# Patient Record
Sex: FEMALE | Race: WHITE | Employment: FULL TIME | URBAN - METROPOLITAN AREA
[De-identification: names, ages, dates, MRNs, and addresses within clinical notes are randomized per-mention and may not be internally consistent; named-entity substitution may affect disease eponyms.]

---

## 2019-02-15 ENCOUNTER — HOSPITAL ENCOUNTER (OUTPATIENT)
Dept: SLEEP MEDICINE | Age: 49
Discharge: HOME | End: 2019-02-15
Payer: COMMERCIAL

## 2019-02-15 PROCEDURE — 95810 POLYSOM 6/> YRS 4/> PARAM: CPT

## 2022-01-04 LAB — PAP SMEAR, EXTERNAL: NORMAL

## 2022-06-22 RX ORDER — SERTRALINE HYDROCHLORIDE 100 MG/1
TABLET, FILM COATED ORAL
Qty: 180 TABLET | Refills: 3 | OUTPATIENT
Start: 2022-06-22

## 2022-09-09 DIAGNOSIS — E89.0 POST-SURGICAL HYPOTHYROIDISM: ICD-10-CM

## 2022-09-09 DIAGNOSIS — E78.2 MIXED HYPERLIPIDEMIA: Primary | ICD-10-CM

## 2022-09-13 ENCOUNTER — NURSE ONLY (OUTPATIENT)
Dept: INTERNAL MEDICINE CLINIC | Facility: CLINIC | Age: 52
End: 2022-09-13

## 2022-09-13 DIAGNOSIS — E89.0 POST-SURGICAL HYPOTHYROIDISM: ICD-10-CM

## 2022-09-13 DIAGNOSIS — E78.2 MIXED HYPERLIPIDEMIA: ICD-10-CM

## 2022-09-14 LAB
ALT SERPL-CCNC: 29 U/L (ref 12–65)
AST SERPL-CCNC: 19 U/L (ref 15–37)
CHOLEST SERPL-MCNC: 282 MG/DL
HDLC SERPL-MCNC: 46 MG/DL (ref 40–60)
HDLC SERPL: 6.1 {RATIO}
LDLC SERPL CALC-MCNC: 187.4 MG/DL
TRIGL SERPL-MCNC: 243 MG/DL (ref 35–150)
TSH, 3RD GENERATION: 7.6 UIU/ML (ref 0.36–3.74)
VLDLC SERPL CALC-MCNC: 48.6 MG/DL (ref 6–23)

## 2022-09-20 ENCOUNTER — OFFICE VISIT (OUTPATIENT)
Dept: INTERNAL MEDICINE CLINIC | Facility: CLINIC | Age: 52
End: 2022-09-20
Payer: COMMERCIAL

## 2022-09-20 VITALS
OXYGEN SATURATION: 100 % | SYSTOLIC BLOOD PRESSURE: 122 MMHG | HEART RATE: 82 BPM | TEMPERATURE: 97.1 F | WEIGHT: 199 LBS | HEIGHT: 68 IN | BODY MASS INDEX: 30.16 KG/M2 | DIASTOLIC BLOOD PRESSURE: 74 MMHG

## 2022-09-20 DIAGNOSIS — F42.9 OBSESSIVE-COMPULSIVE DISORDER, UNSPECIFIED TYPE: ICD-10-CM

## 2022-09-20 DIAGNOSIS — Z79.899 ENCOUNTER FOR LONG-TERM (CURRENT) USE OF MEDICATIONS: ICD-10-CM

## 2022-09-20 DIAGNOSIS — G25.81 RESTLESS LEGS SYNDROME: ICD-10-CM

## 2022-09-20 DIAGNOSIS — Z00.00 LABORATORY EXAM ORDERED AS PART OF ROUTINE GENERAL MEDICAL EXAMINATION: ICD-10-CM

## 2022-09-20 DIAGNOSIS — E89.0 POST-SURGICAL HYPOTHYROIDISM: Primary | ICD-10-CM

## 2022-09-20 DIAGNOSIS — G35 MULTIPLE SCLEROSIS (HCC): ICD-10-CM

## 2022-09-20 DIAGNOSIS — E78.2 MIXED HYPERLIPIDEMIA: ICD-10-CM

## 2022-09-20 DIAGNOSIS — Z80.0 FAMILY HISTORY OF COLON CANCER: ICD-10-CM

## 2022-09-20 PROCEDURE — 99215 OFFICE O/P EST HI 40 MIN: CPT | Performed by: INTERNAL MEDICINE

## 2022-09-20 RX ORDER — LEVOTHYROXINE SODIUM 0.12 MG/1
125 TABLET ORAL DAILY
Qty: 90 TABLET | Refills: 3 | Status: SHIPPED | OUTPATIENT
Start: 2022-09-20

## 2022-09-20 RX ORDER — POTASSIUM CHLORIDE 20 MEQ/1
20 TABLET, EXTENDED RELEASE ORAL 2 TIMES DAILY
Qty: 180 TABLET | Refills: 3 | Status: SHIPPED | OUTPATIENT
Start: 2022-09-20

## 2022-09-20 RX ORDER — PRAMIPEXOLE DIHYDROCHLORIDE 0.25 MG/1
0.25 TABLET ORAL NIGHTLY
Qty: 90 TABLET | Refills: 3 | Status: SHIPPED | OUTPATIENT
Start: 2022-09-20

## 2022-09-20 RX ORDER — ACETAMINOPHEN AND CODEINE PHOSPHATE 120; 12 MG/5ML; MG/5ML
0.35 SOLUTION ORAL DAILY
COMMUNITY
Start: 2022-03-25 | End: 2023-03-25

## 2022-09-20 RX ORDER — SERTRALINE HYDROCHLORIDE 100 MG/1
200 TABLET, FILM COATED ORAL DAILY
Qty: 180 TABLET | Refills: 3 | Status: SHIPPED | OUTPATIENT
Start: 2022-09-20

## 2022-09-20 NOTE — PROGRESS NOTES
ASSESSMENT/PLAN:        1. Post-surgical hypothyroidism  Increase Levothyroxine slightly to 125 mcg daily.    - Lipid Panel; Future  - TSH with Reflex; Future  - Hemoglobin A1C; Future  - Comprehensive Metabolic Panel; Future  - CBC; Future  - levothyroxine (SYNTHROID) 125 MCG tablet; Take 1 tablet by mouth Daily TAKE 1 TABLET BY MOUTH DAILY BEFORE BREAKFAST  Dispense: 90 tablet; Refill: 3    2. Multiple sclerosis [facility])  Per neurology. MRI stable. - Comprehensive Metabolic Panel; Future  - CBC; Future    3. Obsessive-compulsive disorder, unspecified type  Continue Zoloft 200 mg daily. She can take in divided dosing OR at the same time if she prefers. - sertraline (ZOLOFT) 100 MG tablet; Take 2 tablets by mouth daily  Dispense: 180 tablet; Refill: 3  - TSH with Reflex; Future  - Comprehensive Metabolic Panel; Future  - CBC; Future    4. Restless legs syndrome  Treatment regimen is effective. - pramipexole (MIRAPEX) 0.25 MG tablet; Take 1 tablet by mouth at bedtime  Dispense: 90 tablet; Refill: 3    5. Mixed hyperlipidemia  CT Calcium score. Orders for [facility]. Discussed risk stratification and if severely elevated or if there were additional incidental findings, potentially there could be additional testing or evaluation based on findings.    - CT CARDIAC CALCIUM SCORING; Future  - Lipid Panel; Future    6. Family history of colon cancer  Colonoscopy every 5 years. Health maintenance updated to reflect this. 7. Laboratory exam ordered as part of routine general medical examination     - Lipid Panel; Future  - TSH with Reflex; Future  - Hemoglobin A1C; Future  - Comprehensive Metabolic Panel; Future  - CBC; Future    8. Encounter for long-term (current) use of medications     - Lipid Panel; Future  - TSH with Reflex; Future  - Hemoglobin A1C; Future  - Comprehensive Metabolic Panel; Future  - CBC;  Future        Evaluation and management of the chronic condition(s) delineated. No negative side effects reported. I have reviewed all the lab results. There are some abnormalities that are either expected or not critical to the patient's health, and are discussed in the office today and are addressed. Please refer to the above assessement and plan narrative and orders and follow up plan. Medication discussed and refilled as needed. Physical exam findings are stable unless otherwise indicated and this is addressed. The most recent lab work and imaging and consultant/urgent care visits and imaging are reviewed and discussed and considered during this visit encounter. 1. Post-surgical hypothyroidism  -     Lipid Panel; Future  -     TSH with Reflex; Future  -     Hemoglobin A1C; Future  -     Comprehensive Metabolic Panel; Future  -     CBC; Future  -     levothyroxine (SYNTHROID) 125 MCG tablet; Take 1 tablet by mouth Daily TAKE 1 TABLET BY MOUTH DAILY BEFORE BREAKFAST, Disp-90 tablet, R-3Normal  2. Multiple sclerosis ([facility] [address]  -     Comprehensive Metabolic Panel; Future  -     CBC; Future  3. Obsessive-compulsive disorder, unspecified type  -     sertraline (ZOLOFT) 100 MG tablet; Take 2 tablets by mouth daily, Disp-180 tablet, R-3Normal  -     TSH with Reflex; Future  -     Comprehensive Metabolic Panel; Future  -     CBC; Future  4. Restless legs syndrome  -     pramipexole (MIRAPEX) 0.25 MG tablet; Take 1 tablet by mouth at bedtime, Disp-90 tablet, R-3Normal  5. Mixed hyperlipidemia  -     CT CARDIAC CALCIUM SCORING; Future  -     Lipid Panel; Future  6. Family history of colon cancer  7. Laboratory exam ordered as part of routine general medical examination  -     Lipid Panel; Future  -     TSH with Reflex; Future  -     Hemoglobin A1C; Future  -     Comprehensive Metabolic Panel; Future  -     CBC; Future  8. 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 Discussed Shingrix in several months. Mother passed away recently. Family stress with settling estate and some complex family issues with her sister. Her Zoloft has helped her manage and deal with this and she is tolerating 200 mg daily without neg SE. She is grieving normally. She has some difficulty with sleep. RLS has been managed pretty well with low dose Mirapex prn. 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 Sister recently had surgery for Colon Ca. Stage 1. No chemo or adjuvant therapy. Colonoscopy every 5 yrs recommended. Chronic MS and Migraine Headache syndrome managed by neurology. MRI 4/18/2022 and Neurology notes reviewed. Briefly tried methylphenidate 10 mg for fatigue and MS, but has not continued. Pap smear with GYN 1/4/22        Labs reviewed and discussed and medication refilled as needed for chronic medications during ov or adjusted based on lab results and/or our discussion as appropriate. See discussion. The patient's available records and electronic chart records are reviewed. The PMH, PSH, medications, allergies, medications, FH, health maintenance and vaccination status are all reviewed and updated as appropriate. Records from outside providers have been reviewed, summarized, and considered as noted in the history of present illness, past medical history, and objective data of this note and encounter.           Health Maintenance   Topic Date Due    Depression Screen  Never done    Shingles vaccine (1 of 2) Never done    DTaP/Tdap/Td vaccine (3 - Td or Tdap) 08/12/2021    COVID-19 Vaccine (2 - Booster for [place] series) 01/11/2022    Breast cancer screen  09/01/2022    Lipids  09/13/2023    Colorectal Cancer Screen  12/31/2024    Cervical cancer screen  01/04/2025    Flu vaccine  Completed    Hepatitis C screen Completed    HIV screen  Completed    Hepatitis A vaccine  Aged Out    Hepatitis B vaccine  Aged Out    Hib vaccine  Aged Out    Meningococcal (ACWY) vaccine  Aged Out    Pneumococcal 0-64 years Vaccine  Aged Out     Patient Active Problem List   Diagnosis    Mixed hyperlipidemia    Other migraine without status migrainosus, not intractable    Multiple sclerosis ([facility] [address]    Mixed obsessional thoughts and acts    OCD (obsessive compulsive disorder)    Esophageal reflux    H/O seasonal allergies    Post-surgical hypothyroidism    Family history of colon cancer       Review of Systems   All other systems reviewed and are negative.     Lab Results   Component Value Date/Time    WBC 4.4 03/01/2022 08:36 AM    HGB 12.9 03/01/2022 08:36 AM    HCT 40.1 03/01/2022 08:36 AM    MCV 91 03/01/2022 08:36 AM    RDW 12.8 03/01/2022 08:36 AM     03/01/2022 08:36 AM    NEUTOPHILPCT 51 03/01/2022 08:36 AM    LYMPHOPCT 36 03/01/2022 08:36 AM    MONOPCT 9 03/01/2022 08:36 AM    EOSRELPCT 3 03/01/2022 08:36 AM    BASOPCT 1 03/01/2022 08:36 AM    LYMPHSABS 1.6 03/01/2022 08:36 AM    MONOSABS 0.4 03/01/2022 08:36 AM    EOSABS 0.1 03/01/2022 08:36 AM    BASOSABS 0.1 03/01/2022 08:36 AM    IMMGRAN 0 03/01/2022 08:36 AM    GRANULOCYTEABSOLUTECOUNT 0.0 03/01/2022 08:36 AM       Lab Results   Component Value Date/Time     03/01/2022 08:36 AM    K 4.3 03/01/2022 08:36 AM     03/01/2022 08:36 AM    CO2 19 03/01/2022 08:36 AM    GLUCOSE 92 03/01/2022 08:36 AM    BUN 19 03/01/2022 08:36 AM    CREATININE 0.95 03/01/2022 08:36 AM    GFRAA 79 03/26/2021 08:01 AM    CALCIUM 9.1 03/01/2022 08:36 AM    BILITOT 0.2 03/01/2022 08:36 AM    ALT 29 09/13/2022 08:19 AM    AST 19 09/13/2022 08:19 AM    ALKPHOS 56 03/01/2022 08:36 AM    PROT 6.7 03/01/2022 08:36 AM    LABALBU 4.2 03/01/2022 08:36 AM       Lab Results   Component Value Date/Time    CHOL 282 09/13/2022 08:19 AM    HDL 46 09/13/2022 08:19 AM    TRIG 243 09/13/2022 08:19 AM    LDLCALC 187.4 09/13/2022 08:19 AM    VLDL 28 03/01/2022 08:36 AM       Lab Results   Component Value Date/Time    LABA1C 5.4 03/01/2022 08:36 AM    LABA1C 5.3 09/18/2020 08:27 AM       Lab Results   Component Value Date/Time    TSH 3.480 03/01/2022 08:36 AM    TSH 3.300 07/14/2021 03:35 PM    TSH 6.290 03/26/2021 08:01 AM           Lab Results   Component Value Date/Time    SPECGRAV 1.018 03/01/2022 08:36 AM    PHUR 7.0 03/01/2022 08:36 AM    COLORU Yellow 03/01/2022 08:36 AM    CLARITYU Cloudy 03/01/2022 08:36 AM    LEUKOCYTESUR Negative 03/01/2022 08:36 AM    PROTEINU Negative 03/01/2022 08:36 AM    GLUCOSEU Negative 03/01/2022 08:36 AM    KETUA Negative 03/01/2022 08:36 AM    BLOODU Negative 03/01/2022 08:36 AM    BILIRUBINUR Negative 03/01/2022 08:36 AM    UROBILINOGEN 0.2 03/01/2022 08:36 AM    NITRU Negative 03/01/2022 08:36 AM    LABMICR Comment 03/01/2022 08:36 AM           Vitals:    09/20/22 1530   BP: 122/74   Site: Left Upper Arm   Position: Sitting   Cuff Size: Small Adult   Pulse: 82   Temp: 97.1 °F (36.2 °C)   TempSrc: Skin   SpO2: 100%   Weight: 199 lb (90.3 kg)   Height: 5' 8\" (1.727 m)     Wt Readings from Last 3 Encounters:   09/20/22 199 lb (90.3 kg)   03/08/22 202 lb (91.6 kg)     BP Readings from Last 3 Encounters:   09/20/22 122/74   03/08/22 120/76     Physical Exam  Vitals and nursing note reviewed. Constitutional:       Appearance: Normal appearance. She is not ill-appearing. HENT:      Head: Normocephalic and atraumatic. Eyes:      Extraocular Movements: Extraocular movements intact. Conjunctiva/sclera: Conjunctivae normal.   Cardiovascular:      Rate and Rhythm: Normal rate and regular rhythm. Pulmonary:      Effort: Pulmonary effort is normal.      Breath sounds: Normal breath sounds. Neurological:      General: No focal deficit present. Mental Status: She is alert and oriented to person, place, and time. Mental status is at baseline.    Psychiatric:         Mood and Affect: Mood normal.         Behavior: Behavior normal.

## 2022-09-20 NOTE — Clinical Note
Please let her know I did adjust her Synthroid to 125 mcg. I think we got off topic a little and forgot to let her know that's what I decided. Thanks.

## 2022-09-21 ENCOUNTER — TELEPHONE (OUTPATIENT)
Dept: INTERNAL MEDICINE CLINIC | Facility: CLINIC | Age: 52
End: 2022-09-21

## 2022-09-21 NOTE — TELEPHONE ENCOUNTER
----- Message from Ivsi Villalobos MD sent at 9/20/2022  5:33 PM EDT -----  Please let her know I did adjust her Synthroid to 125 mcg. I think we got off topic a little and forgot to let her know that's what I decided. Thanks.

## 2022-10-07 ENCOUNTER — HOSPITAL ENCOUNTER (OUTPATIENT)
Dept: CT IMAGING | Age: 52
Discharge: HOME | End: 2022-10-10

## 2022-10-07 DIAGNOSIS — E78.2 MIXED HYPERLIPIDEMIA: ICD-10-CM

## 2022-10-07 PROCEDURE — 75571 CT HRT W/O DYE W/CA TEST: CPT

## 2022-10-09 NOTE — RESULT ENCOUNTER NOTE
CT calcium score is Zero. That would argue for a very low cardiovascular risk. I will not push you to take a statin medication at this time. I will review the images with you at  your next visit. Good news in my opinion. Stefanie Gupta MD

## 2023-03-27 LAB
AVERAGE GLUCOSE: NORMAL
HBA1C MFR BLD: 5.3 %

## 2023-03-30 ENCOUNTER — OFFICE VISIT (OUTPATIENT)
Dept: INTERNAL MEDICINE CLINIC | Facility: CLINIC | Age: 53
End: 2023-03-30
Payer: COMMERCIAL

## 2023-03-30 VITALS
WEIGHT: 201 LBS | SYSTOLIC BLOOD PRESSURE: 122 MMHG | BODY MASS INDEX: 30.46 KG/M2 | OXYGEN SATURATION: 98 % | DIASTOLIC BLOOD PRESSURE: 82 MMHG | TEMPERATURE: 97.2 F | HEIGHT: 68 IN | HEART RATE: 73 BPM

## 2023-03-30 DIAGNOSIS — Z12.31 ENCOUNTER FOR SCREENING MAMMOGRAM FOR MALIGNANT NEOPLASM OF BREAST: ICD-10-CM

## 2023-03-30 DIAGNOSIS — G35 MULTIPLE SCLEROSIS (HCC): ICD-10-CM

## 2023-03-30 DIAGNOSIS — E78.2 MIXED HYPERLIPIDEMIA: ICD-10-CM

## 2023-03-30 DIAGNOSIS — F42.9 OBSESSIVE-COMPULSIVE DISORDER, UNSPECIFIED TYPE: ICD-10-CM

## 2023-03-30 DIAGNOSIS — E89.0 POST-SURGICAL HYPOTHYROIDISM: Primary | ICD-10-CM

## 2023-03-30 DIAGNOSIS — Z80.0 FAMILY HISTORY OF COLON CANCER: ICD-10-CM

## 2023-03-30 DIAGNOSIS — G25.81 RESTLESS LEGS SYNDROME: ICD-10-CM

## 2023-03-30 PROCEDURE — 99214 OFFICE O/P EST MOD 30 MIN: CPT | Performed by: INTERNAL MEDICINE

## 2023-03-30 RX ORDER — PRAMIPEXOLE DIHYDROCHLORIDE 0.25 MG/1
0.25 TABLET ORAL NIGHTLY
Qty: 90 TABLET | Refills: 3 | Status: SHIPPED | OUTPATIENT
Start: 2023-03-30

## 2023-03-30 RX ORDER — CONJUGATED ESTROGENS 0.62 MG/G
CREAM VAGINAL
COMMUNITY
Start: 2023-02-06

## 2023-03-30 RX ORDER — SERTRALINE HYDROCHLORIDE 100 MG/1
200 TABLET, FILM COATED ORAL DAILY
Qty: 180 TABLET | Refills: 3 | Status: SHIPPED | OUTPATIENT
Start: 2023-03-30

## 2023-03-30 RX ORDER — POTASSIUM CHLORIDE 20 MEQ/1
20 TABLET, EXTENDED RELEASE ORAL 2 TIMES DAILY
Qty: 180 TABLET | Refills: 3 | Status: SHIPPED | OUTPATIENT
Start: 2023-03-30

## 2023-03-30 RX ORDER — INTERFERON BETA-1A 30MCG/.5ML
KIT INTRAMUSCULAR
COMMUNITY
Start: 2023-03-28

## 2023-03-30 RX ORDER — LEVOTHYROXINE SODIUM 0.12 MG/1
125 TABLET ORAL DAILY
Qty: 90 TABLET | Refills: 3 | Status: SHIPPED | OUTPATIENT
Start: 2023-03-30

## 2023-03-30 SDOH — ECONOMIC STABILITY: HOUSING INSECURITY
IN THE LAST 12 MONTHS, WAS THERE A TIME WHEN YOU DID NOT HAVE A STEADY PLACE TO SLEEP OR SLEPT IN A SHELTER (INCLUDING NOW)?: NO

## 2023-03-30 SDOH — ECONOMIC STABILITY: FOOD INSECURITY: WITHIN THE PAST 12 MONTHS, THE FOOD YOU BOUGHT JUST DIDN'T LAST AND YOU DIDN'T HAVE MONEY TO GET MORE.: NEVER TRUE

## 2023-03-30 SDOH — ECONOMIC STABILITY: FOOD INSECURITY: WITHIN THE PAST 12 MONTHS, YOU WORRIED THAT YOUR FOOD WOULD RUN OUT BEFORE YOU GOT MONEY TO BUY MORE.: NEVER TRUE

## 2023-03-30 SDOH — ECONOMIC STABILITY: INCOME INSECURITY: HOW HARD IS IT FOR YOU TO PAY FOR THE VERY BASICS LIKE FOOD, HOUSING, MEDICAL CARE, AND HEATING?: NOT HARD AT ALL

## 2023-03-30 ASSESSMENT — PATIENT HEALTH QUESTIONNAIRE - PHQ9
1. LITTLE INTEREST OR PLEASURE IN DOING THINGS: 0
SUM OF ALL RESPONSES TO PHQ QUESTIONS 1-9: 0
SUM OF ALL RESPONSES TO PHQ9 QUESTIONS 1 & 2: 0
SUM OF ALL RESPONSES TO PHQ QUESTIONS 1-9: 0
SUM OF ALL RESPONSES TO PHQ QUESTIONS 1-9: 0
2. FEELING DOWN, DEPRESSED OR HOPELESS: 0
SUM OF ALL RESPONSES TO PHQ QUESTIONS 1-9: 0

## 2023-03-30 NOTE — PROGRESS NOTES
Patient denies change in energy level, diarrhea, heat / cold intolerance, nervousness, palpitations, and weight changes. TSH has fluctuated some. Lab Results   Component Value Date    TSH 3.480 03/01/2022    RFN2NRT 7.600 (H) 09/13/2022     Shingles resolved. Consider Shingrix. Family stress with settling estate improved. She has some complex family issues with her sister. She has a sister that has colon cancer as well. Her Zoloft has helped her manage and deal with this and she is tolerating 200 mg daily without neg SE. She has a new dog. Very pleased. Having fun. Family trip planned to beach this summer on [place].    She has grieved the loss of her mother normally. She has some difficulty with sleep. RLS has been managed pretty well with low dose Mirapex prn. 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 Sister recently had surgery for Colon Ca. Stage 1. No chemo or adjuvant therapy. Colonoscopy every 5 yrs recommended. Pap smear with GYN 1/4/22      Multiple Sclerosis:  Neurology following- followed by Dr. Rogelio Morse in [place], [place]. Brain and Cervical mri's done (10/27/17) and reviewed from [facility]. Stable. No new lesions. Currently on Avonex. She took Avonex for some time and more recently was switched to Tecfidera. Vision returned to normal early on and Ms. Simmons says she has never had any other symptoms. She stoppedTecfidera because she could not tolerate the side effects of flushing and nausea. She could not tolerate Gilenya because of more frequent headaches. She is now back on Avonex. MRI 4/18/2022 and Neurology notes reviewed. Briefly tried methylphenidate 10 mg for fatigue and MS, but has not continued. Migraine Headaches:  Headaches are described as a unilateral, throbbing type pain, often accompanied by photophobia, phonophobia, and occ. Nausea.   She has had

## 2023-03-30 NOTE — Clinical Note
Mammogram results. Need report for HM from [facility]. Reportedly normal but no report. 9/7/22.   Stephen Nova MD

## 2023-03-31 ENCOUNTER — TELEPHONE (OUTPATIENT)
Dept: INTERNAL MEDICINE CLINIC | Facility: CLINIC | Age: 53
End: 2023-03-31

## 2023-03-31 NOTE — TELEPHONE ENCOUNTER
----- Message from Mendel Fuelling, MD sent at 3/30/2023  4:30 PM EDT -----  Mammogram results. Need report for HM from [facility]. Reportedly normal but no report. 9/7/22.   Kevin Barros MD

## 2023-04-21 ENCOUNTER — TELEPHONE (OUTPATIENT)
Dept: INTERNAL MEDICINE CLINIC | Facility: CLINIC | Age: 53
End: 2023-04-21

## 2023-04-29 PROBLEM — Z12.31 ENCOUNTER FOR SCREENING MAMMOGRAM FOR MALIGNANT NEOPLASM OF BREAST: Status: RESOLVED | Noted: 2023-03-30 | Resolved: 2023-04-29

## 2023-09-26 DIAGNOSIS — Z80.0 FAMILY HISTORY OF COLON CANCER: ICD-10-CM

## 2023-09-26 DIAGNOSIS — G25.81 RESTLESS LEGS SYNDROME: ICD-10-CM

## 2023-09-26 DIAGNOSIS — E89.0 POST-SURGICAL HYPOTHYROIDISM: ICD-10-CM

## 2023-09-26 DIAGNOSIS — E78.2 MIXED HYPERLIPIDEMIA: ICD-10-CM

## 2023-09-26 DIAGNOSIS — G35 MULTIPLE SCLEROSIS (HCC): ICD-10-CM

## 2023-09-26 DIAGNOSIS — F42.9 OBSESSIVE-COMPULSIVE DISORDER, UNSPECIFIED TYPE: ICD-10-CM

## 2023-09-30 ASSESSMENT — PATIENT HEALTH QUESTIONNAIRE - PHQ9
SUM OF ALL RESPONSES TO PHQ QUESTIONS 1-9: 2
SUM OF ALL RESPONSES TO PHQ QUESTIONS 1-9: 2
2. FEELING DOWN, DEPRESSED OR HOPELESS: SEVERAL DAYS
SUM OF ALL RESPONSES TO PHQ9 QUESTIONS 1 & 2: 2
1. LITTLE INTEREST OR PLEASURE IN DOING THINGS: SEVERAL DAYS
2. FEELING DOWN, DEPRESSED OR HOPELESS: 1
1. LITTLE INTEREST OR PLEASURE IN DOING THINGS: 1
SUM OF ALL RESPONSES TO PHQ QUESTIONS 1-9: 2
SUM OF ALL RESPONSES TO PHQ9 QUESTIONS 1 & 2: 2
SUM OF ALL RESPONSES TO PHQ QUESTIONS 1-9: 2

## 2023-10-03 ENCOUNTER — OFFICE VISIT (OUTPATIENT)
Dept: INTERNAL MEDICINE CLINIC | Facility: CLINIC | Age: 53
End: 2023-10-03
Payer: COMMERCIAL

## 2023-10-03 VITALS
SYSTOLIC BLOOD PRESSURE: 130 MMHG | TEMPERATURE: 97.2 F | WEIGHT: 202 LBS | HEIGHT: 68 IN | BODY MASS INDEX: 30.62 KG/M2 | DIASTOLIC BLOOD PRESSURE: 82 MMHG | HEART RATE: 80 BPM | OXYGEN SATURATION: 98 %

## 2023-10-03 DIAGNOSIS — F42.9 OBSESSIVE-COMPULSIVE DISORDER, UNSPECIFIED TYPE: ICD-10-CM

## 2023-10-03 DIAGNOSIS — Z98.890 S/P RIGHT BREAST BIOPSY: ICD-10-CM

## 2023-10-03 DIAGNOSIS — E89.0 POST-SURGICAL HYPOTHYROIDISM: Primary | ICD-10-CM

## 2023-10-03 DIAGNOSIS — E78.2 MIXED HYPERLIPIDEMIA: ICD-10-CM

## 2023-10-03 DIAGNOSIS — G25.81 RESTLESS LEGS SYNDROME: ICD-10-CM

## 2023-10-03 DIAGNOSIS — G35 MULTIPLE SCLEROSIS (HCC): ICD-10-CM

## 2023-10-03 PROCEDURE — 99214 OFFICE O/P EST MOD 30 MIN: CPT | Performed by: INTERNAL MEDICINE

## 2023-10-03 RX ORDER — POTASSIUM CHLORIDE 20 MEQ/1
20 TABLET, EXTENDED RELEASE ORAL NIGHTLY
Qty: 90 TABLET | Refills: 3 | Status: SHIPPED | OUTPATIENT
Start: 2023-10-03

## 2023-10-03 RX ORDER — LEVOTHYROXINE SODIUM 0.12 MG/1
125 TABLET ORAL DAILY
Qty: 90 TABLET | Refills: 3 | Status: CANCELLED | OUTPATIENT
Start: 2023-10-03

## 2023-10-03 RX ORDER — SERTRALINE HYDROCHLORIDE 100 MG/1
200 TABLET, FILM COATED ORAL DAILY
Qty: 180 TABLET | Refills: 3 | Status: SHIPPED | OUTPATIENT
Start: 2023-10-03

## 2023-10-03 RX ORDER — PRAMIPEXOLE DIHYDROCHLORIDE 0.25 MG/1
0.5 TABLET ORAL NIGHTLY
Qty: 180 TABLET | Refills: 3 | Status: SHIPPED | OUTPATIENT
Start: 2023-10-03

## 2023-10-03 NOTE — PATIENT INSTRUCTIONS
Repeat TSH. Will check T4 as well. Will not fill Synthroid until this results. Adjustment may be needed.     Medication refilled    Orders Placed This Encounter    TSH     Standing Status:   Future     Standing Expiration Date:   10/3/2024    T4     Standing Status:   Future     Standing Expiration Date:   10/3/2024    potassium chloride (KLOR-CON M) 20 MEQ extended release tablet     Sig: Take 1 tablet by mouth at bedtime     Dispense:  90 tablet     Refill:  3    pramipexole (MIRAPEX) 0.25 MG tablet     Sig: Take 2 tablets by mouth at bedtime     Dispense:  180 tablet     Refill:  3    sertraline (ZOLOFT) 100 MG tablet     Sig: Take 2 tablets by mouth daily     Dispense:  180 tablet     Refill:  3

## 2023-10-03 NOTE — PROGRESS NOTES
systems reviewed and are negative.       Lab Results   Component Value Date/Time    WBC 4.4 03/01/2022 08:36 AM    HGB 12.9 03/01/2022 08:36 AM    HCT 40.1 03/01/2022 08:36 AM    MCV 91 03/01/2022 08:36 AM    RDW 12.8 03/01/2022 08:36 AM     03/01/2022 08:36 AM    NEUTOPHILPCT 51 03/01/2022 08:36 AM    LYMPHOPCT 36 03/01/2022 08:36 AM    MONOPCT 9 03/01/2022 08:36 AM    EOSRELPCT 3 03/01/2022 08:36 AM    BASOPCT 1 03/01/2022 08:36 AM    LYMPHSABS 1.6 03/01/2022 08:36 AM    MONOSABS 0.4 03/01/2022 08:36 AM    EOSABS 0.1 03/01/2022 08:36 AM    BASOSABS 0.1 03/01/2022 08:36 AM    IMMGRAN 0 03/01/2022 08:36 AM    GRANULOCYTEABSOLUTECOUNT 0.0 03/01/2022 08:36 AM       Lab Results   Component Value Date/Time     03/01/2022 08:36 AM    K 4.3 03/01/2022 08:36 AM     03/01/2022 08:36 AM    CO2 19 03/01/2022 08:36 AM    GLUCOSE 92 03/01/2022 08:36 AM    BUN 19 03/01/2022 08:36 AM    CREATININE 0.95 03/01/2022 08:36 AM    GFRAA 79 03/26/2021 08:01 AM    LABGLOM 72 03/01/2022 08:36 AM    CALCIUM 9.1 03/01/2022 08:36 AM    BILITOT 0.2 03/01/2022 08:36 AM    ALT 29 09/13/2022 08:19 AM    AST 19 09/13/2022 08:19 AM    ALKPHOS 56 03/01/2022 08:36 AM    PROT 6.7 03/01/2022 08:36 AM    LABALBU 4.2 03/01/2022 08:36 AM       Lab Results   Component Value Date/Time    CHOL 282 09/13/2022 08:19 AM    HDL 46 09/13/2022 08:19 AM    TRIG 243 09/13/2022 08:19 AM    LDLCALC 187.4 09/13/2022 08:19 AM    VLDL 28 03/01/2022 08:36 AM       Lab Results   Component Value Date/Time    LABA1C 5.3 03/27/2023 12:00 AM    LABA1C 5.4 03/01/2022 08:36 AM    LABA1C 5.3 09/18/2020 08:27 AM       Lab Results   Component Value Date/Time    TSH 3.480 03/01/2022 08:36 AM    TSH 3.300 07/14/2021 03:35 PM    TSH 6.290 03/26/2021 08:01 AM           Lab Results   Component Value Date/Time    SPECGRAV 1.018 03/01/2022 08:36 AM    PHUR 7.0 03/01/2022 08:36 AM    COLORU Yellow 03/01/2022 08:36 AM    CLARITYU Cloudy 03/01/2022 08:36 AM    LEUKOCYTESUR

## 2023-10-05 ENCOUNTER — TELEPHONE (OUTPATIENT)
Dept: INTERNAL MEDICINE CLINIC | Facility: CLINIC | Age: 53
End: 2023-10-05

## 2023-10-05 DIAGNOSIS — E89.0 POST-SURGICAL HYPOTHYROIDISM: ICD-10-CM

## 2023-10-05 NOTE — TELEPHONE ENCOUNTER
Pt called, states got labs done [facility] in [facility]. States results for T4 and TSH are now available. Gave pt our fax number to give to [facility] to have results faxed to our practice.

## 2023-10-12 RX ORDER — LEVOTHYROXINE SODIUM 137 UG/1
137 TABLET ORAL DAILY
Qty: 90 TABLET | Refills: 1 | Status: SHIPPED | OUTPATIENT
Start: 2023-10-12

## 2023-10-13 NOTE — RESULT ENCOUNTER NOTE
Increase Synthroid to 137 mcg, re check TSH in 6 wks. TSH still elevated. 13.2 and T4 is low 1.08. I'm not sure if this will be the correct dose? I would like to adjust slowly. Will plan to check TSH and T4 in 6 wks. Orders placed. Please let us know if you have any questions.   (Fyveda, I placed the orders for outside lab so you can complete at [facility])    Ramila Blair MD

## 2023-10-19 ENCOUNTER — TELEPHONE (OUTPATIENT)
Dept: INTERNAL MEDICINE CLINIC | Facility: CLINIC | Age: 53
End: 2023-10-19

## 2023-11-22 DIAGNOSIS — E89.0 POST-SURGICAL HYPOTHYROIDISM: ICD-10-CM

## 2023-11-28 ENCOUNTER — COMMUNITY OUTREACH (OUTPATIENT)
Dept: INTERNAL MEDICINE CLINIC | Facility: CLINIC | Age: 53
End: 2023-11-28

## 2024-03-25 DIAGNOSIS — E78.2 MIXED HYPERLIPIDEMIA: ICD-10-CM

## 2024-03-25 DIAGNOSIS — E89.0 POST-SURGICAL HYPOTHYROIDISM: Primary | ICD-10-CM

## 2024-03-31 ASSESSMENT — PATIENT HEALTH QUESTIONNAIRE - PHQ9
1. LITTLE INTEREST OR PLEASURE IN DOING THINGS: NOT AT ALL
SUM OF ALL RESPONSES TO PHQ QUESTIONS 1-9: 0
SUM OF ALL RESPONSES TO PHQ9 QUESTIONS 1 & 2: 0
SUM OF ALL RESPONSES TO PHQ QUESTIONS 1-9: 0
SUM OF ALL RESPONSES TO PHQ9 QUESTIONS 1 & 2: 0
2. FEELING DOWN, DEPRESSED OR HOPELESS: NOT AT ALL
2. FEELING DOWN, DEPRESSED OR HOPELESS: NOT AT ALL
SUM OF ALL RESPONSES TO PHQ QUESTIONS 1-9: 0
1. LITTLE INTEREST OR PLEASURE IN DOING THINGS: NOT AT ALL
SUM OF ALL RESPONSES TO PHQ QUESTIONS 1-9: 0

## 2024-04-03 ENCOUNTER — OFFICE VISIT (OUTPATIENT)
Dept: INTERNAL MEDICINE CLINIC | Facility: CLINIC | Age: 54
End: 2024-04-03

## 2024-04-03 VITALS
HEIGHT: 68 IN | SYSTOLIC BLOOD PRESSURE: 124 MMHG | BODY MASS INDEX: 31.07 KG/M2 | WEIGHT: 205 LBS | HEART RATE: 75 BPM | TEMPERATURE: 97 F | OXYGEN SATURATION: 99 % | DIASTOLIC BLOOD PRESSURE: 72 MMHG

## 2024-04-03 DIAGNOSIS — E89.0 POST-SURGICAL HYPOTHYROIDISM: Primary | ICD-10-CM

## 2024-04-03 DIAGNOSIS — E78.2 MIXED HYPERLIPIDEMIA: ICD-10-CM

## 2024-04-03 DIAGNOSIS — Z98.890 HISTORY OF RIGHT BREAST BIOPSY: ICD-10-CM

## 2024-04-03 DIAGNOSIS — N39.42 URINARY INCONTINENCE WITHOUT SENSORY AWARENESS: ICD-10-CM

## 2024-04-03 DIAGNOSIS — G25.81 RESTLESS LEGS SYNDROME: ICD-10-CM

## 2024-04-03 DIAGNOSIS — G35 MULTIPLE SCLEROSIS (HCC): ICD-10-CM

## 2024-04-03 DIAGNOSIS — F42.9 OBSESSIVE-COMPULSIVE DISORDER, UNSPECIFIED TYPE: ICD-10-CM

## 2024-04-03 RX ORDER — SERTRALINE HYDROCHLORIDE 100 MG/1
200 TABLET, FILM COATED ORAL DAILY
Qty: 180 TABLET | Refills: 3 | Status: SHIPPED | OUTPATIENT
Start: 2024-04-03

## 2024-04-03 RX ORDER — CETIRIZINE HYDROCHLORIDE 10 MG/1
10 TABLET ORAL DAILY
COMMUNITY

## 2024-04-03 RX ORDER — POTASSIUM CHLORIDE 20 MEQ/1
20 TABLET, EXTENDED RELEASE ORAL NIGHTLY
Qty: 90 TABLET | Refills: 3 | Status: SHIPPED | OUTPATIENT
Start: 2024-04-03

## 2024-04-03 RX ORDER — LEVOTHYROXINE SODIUM 137 UG/1
137 TABLET ORAL DAILY
Qty: 90 TABLET | Refills: 3 | Status: SHIPPED | OUTPATIENT
Start: 2024-04-03

## 2024-04-03 RX ORDER — PRAMIPEXOLE DIHYDROCHLORIDE 0.25 MG/1
0.5 TABLET ORAL NIGHTLY
Qty: 180 TABLET | Refills: 3 | Status: SHIPPED | OUTPATIENT
Start: 2024-04-03

## 2024-04-03 ASSESSMENT — ENCOUNTER SYMPTOMS
TROUBLE SWALLOWING: 0
SHORTNESS OF BREATH: 0
NAUSEA: 0
VOMITING: 0
VOICE CHANGE: 0
BACK PAIN: 0
ABDOMINAL PAIN: 0
CHEST TIGHTNESS: 0

## 2024-04-03 NOTE — ASSESSMENT & PLAN NOTE
FREDDY eval neg.    Mirapex 0.25 mg 1-2 hs prn effective.  However, discontinue for now due to recent urge incontinence until understand issue further.

## 2024-04-03 NOTE — ASSESSMENT & PLAN NOTE
Reports she has had 3 episodes of urine incontinence since our last visit. She reports fairly large volume of urine, not just leakage.       The most recent was while at work 3/30/24.      She was seen at urgent care and treated for uti with TMP-SMX for presumed infection.      Stop Mirapex- adverse SE?  (RLS)  Due to her MS, we discussed if she likely needs some updated imaging.  She will let me know if any assistance is needed.    Gyn check up current.  Neg pap and pelvic exam.    Discussed Urogyn may be needed if continues.  Monitor.

## 2024-04-03 NOTE — ASSESSMENT & PLAN NOTE
Continue Synthroid 137 mcg daily.    Stable on 125 mcg for yrs and then developed fluctuation and increase of TSH mid 2023.  Medication adjusted.    Appears controlled at this time.      Remote complete thyroidectomy approx 2000 at [facility].  (Presumed papillary thyroid ca??? ) Due to her surgery being so long ago, the pathology is not immediately available on extensive chart review today.      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  TSH and T4 are controlled.   At the time of TSH changing,  this could be related to hormonal changes, medication changes and adjustments or lab error or potentially related to a breast biopsy and surgery 9/28/23???  In light of the additional history obtained today, endocrinology expertise to review my treatment, and make any additional moving forward.  She is agreeable to this plan.      Request Pathology report if available from [facility] and the patient will try and locate as well.

## 2024-04-03 NOTE — ASSESSMENT & PLAN NOTE
LDL elevated, with some improvement without any significant diet changes.  Not regularly following a low fat, low cholesterol diet.  She is active and busy, but not exercising regularly.     Intolerant of statins and zetia    A healthy diet to consider is a more mediterranean diet which is abundant in fruits, vegetables, whole grains, legumes and olive oil. It features fish and poultry, lean sources of protein over red meat.  The importance of a healthy lifestyle are discussed.  Limit high fructose containing foods, moderate portion sizes,  regular cardiovascular exercise (walking, swimming, aerobics) for 30-45 minutes, 3-4 times weekly.      The GINGER trial (Justification for the Use of Statins in Prevention: an Intervention Trial Evaluating Rosuvastatin) (N Engl J Med 2008; 359:2457-8323) is probably the best trial we that has shown benefit for primary prevention and end point data with the use of a statin.        Lab Results   Component Value Date    CHOL 282 (H) 09/13/2022    TRIG 243 (H) 09/13/2022    HDL 46 09/13/2022    LDLCALC 187.4 (H) 09/13/2022     Lab Results   Component Value Date    ALT 29 09/13/2022    AST 19 09/13/2022

## 2024-04-03 NOTE — ASSESSMENT & PLAN NOTE
Neurology following.  On Avonex.  Routine follow up scheduled with Dr. Mcconnell with [facility], 4/24/24  On our review of her history today, she does report she has had 3 episodes of urine incontinence since our last visit. She reports fairly large volume of urine, not just leakage.   The most recent was while at work last weekend.  She was seen at urgent care and treated for uti with TMP-SMX for presumed infection.      Stop Mirapex- adverse SE?  (RLS)  We discussed if she likely needs some updated imaging.  She will let me know if any assistance is needed.

## 2024-04-03 NOTE — PATIENT INSTRUCTIONS
The medication list included in this document is our record of what you are currently taking, including any changes that were made at today's visit.  If you find any differences when compared to your medications at home, or have any questions that were not answered at your visit, please contact the office.    Please try to get the path report from back in 2000 you had total thyroidectomy.    Refer to endocrinology for thyroid checkup post thyroidectomy and recent fluctuation of TSH which appears to be controlled at this time.  I would like an additional look over remote pathology you had a any recommendations on medication adjustments moving forward.    Please be sure and let your neurologist know about the urine issue recently.  If this continues to need assistance with routine MS imaging, please let me know.  I would recommend holding off on Mirapex as this might be related.?

## 2024-04-03 NOTE — PROGRESS NOTES
Abdomen is soft.   Musculoskeletal:      Cervical back: Full passive range of motion without pain.      Right lower leg: No edema.      Left lower leg: No edema.   Lymphadenopathy:      Cervical: No cervical adenopathy.      Right cervical: No superficial, deep or posterior cervical adenopathy.     Left cervical: No superficial, deep or posterior cervical adenopathy.   Neurological:      General: No focal deficit present.      Mental Status: She is alert and oriented to person, place, and time. Mental status is at baseline.      Cranial Nerves: No cranial nerve deficit, dysarthria or facial asymmetry.      Motor: Motor function is intact.      Coordination: Coordination is intact.      Gait: Gait normal.   Psychiatric:         Mood and Affect: Mood normal.         Behavior: Behavior normal.

## 2024-05-01 ENCOUNTER — OFFICE VISIT (OUTPATIENT)
Dept: ENDOCRINOLOGY | Age: 54
End: 2024-05-01
Payer: COMMERCIAL

## 2024-05-01 VITALS
OXYGEN SATURATION: 93 % | DIASTOLIC BLOOD PRESSURE: 72 MMHG | BODY MASS INDEX: 30.8 KG/M2 | WEIGHT: 203.2 LBS | SYSTOLIC BLOOD PRESSURE: 122 MMHG | HEIGHT: 68 IN | HEART RATE: 83 BPM

## 2024-05-01 DIAGNOSIS — Z85.850 HX OF PAPILLARY THYROID CARCINOMA: ICD-10-CM

## 2024-05-01 DIAGNOSIS — E89.0 POST-SURGICAL HYPOTHYROIDISM: Primary | ICD-10-CM

## 2024-05-01 PROCEDURE — 99204 OFFICE O/P NEW MOD 45 MIN: CPT | Performed by: PHYSICIAN ASSISTANT

## 2024-05-01 ASSESSMENT — ENCOUNTER SYMPTOMS
CONSTIPATION: 1
TROUBLE SWALLOWING: 0
DIARRHEA: 0
VOICE CHANGE: 0

## 2024-05-01 NOTE — PROGRESS NOTES
[facility] ENDOCRINOLOGY   AND   THYROID NODULE CLINIC    Ginger Little PA-C  [facility] Endocrinology and Thyroid Nodule Clinic  [address] [place] [address], [facility] [address] [address]  Phone [phone]  Facsimile [phone]          Risa Simmons is a 53 y.o. female seen 5/1/2024 at the request of Dr. Shalom Beebe for the evaluation of post surgical hypothyroidism         Assessment and Plan:      1. Post-surgical hypothyroidism  Doing well. Appropriate dosing instructions reviewed. Take with water.  Avoid biotin 3-5 days before next set of labs  TSH goal <2.5      Thyroid replacement dosing instructions:    Take first thing in the morning when your stomach is empty  Take with only water (no other liquids like coffee, soda, juice, tea)  Take by itself, nothing else to eat or drink  Wait 30 minutes to 1 hour before eating or drinking anything other than water  Take vitamins/minerals and stomach acid medication at least 4 hours later (lunch or later)    Plan to have your labs rechecked in about 8 weeks at the same lab    - TSH; Future  - T4, Free; Future  - Thyroglobulin Ab and Thyroglobulin, BATSHEVA or HEENA; Future  - TSH; Future  - T4, Free; Future    2. Hx of papillary thyroid carcinoma  Historical paperwork reviewed. Check thyroglobulin. If abnormal, consider US of thyroid bed  - Thyroglobulin Ab and Thyroglobulin, BATSHEVA or HEENA; Future        Risa was seen today for new patient.    Diagnoses and all orders for this visit:    Post-surgical hypothyroidism  -     TSH; Future  -     T4, Free; Future  -     Thyroglobulin Ab and Thyroglobulin, BATSHEVA or HEENA; Future  -     TSH; Future  -     T4, Free; Future    Hx of papillary thyroid carcinoma  -     Thyroglobulin Ab and Thyroglobulin, BATSHEVA or HEENA; Future            History of Present Illness:  05/01/24 5/1/2024     THYROID DYSFUNCTION  Risa Simmons is seen for new evaluation/management of post surgical hypothyroidism; this was diagnosed in 2000 after thyroidectomy for

## 2024-06-09 DIAGNOSIS — G25.81 RESTLESS LEGS SYNDROME: ICD-10-CM

## 2024-06-10 RX ORDER — PRAMIPEXOLE DIHYDROCHLORIDE 0.25 MG/1
0.25 TABLET ORAL NIGHTLY
Qty: 90 TABLET | Refills: 3 | OUTPATIENT
Start: 2024-06-10

## 2024-06-21 ENCOUNTER — TELEPHONE (OUTPATIENT)
Dept: ENDOCRINOLOGY | Age: 54
End: 2024-06-21

## 2024-06-21 DIAGNOSIS — E89.0 POST-SURGICAL HYPOTHYROIDISM: ICD-10-CM

## 2024-06-21 RX ORDER — LEVOTHYROXINE SODIUM 137 UG/1
TABLET ORAL
Qty: 90 TABLET | Refills: 3
Start: 2024-06-21

## 2024-06-21 NOTE — TELEPHONE ENCOUNTER
Received faxed thyroid labs   06/14/2024    TSH  0.197    Free T4 1.35    No result for thyroglobulin Ab    [facility] response:    Hi,  Your labs resulted and it appears that you are now slightly overtreated on 137mcg levothyroxine     Please start taking only 1/2 tab one day per week (may on Sunday) and 1 tablet every other day which will give you a TDD of 127mcg. Please recheck labs as planned just before our follow up.     It seems you are taking the medication more correctly, with water instead of hot chocolate. I will put the dosing instructions below as well.    Let me know that you got this message and if you have any questions.     ~Ginger      Thyroid replacement dosing instructions:    Take first thing in the morning when your stomach is empty  Take with only water (no other liquids like coffee, soda, juice, tea)  Take by itself, nothing else to eat or drink  Wait 30 minutes to 1 hour before eating or drinking anything other than water  Take vitamins/minerals and stomach acid medication at least 4 hours later (lunch or later)  Avoid biotin 3-5 days before labs drawn    Plan to have your labs rechecked in about 8 weeks at the same lab

## 2024-09-03 ENCOUNTER — OFFICE VISIT (OUTPATIENT)
Dept: ENDOCRINOLOGY | Age: 54
End: 2024-09-03
Payer: COMMERCIAL

## 2024-09-03 VITALS
BODY MASS INDEX: 29.8 KG/M2 | SYSTOLIC BLOOD PRESSURE: 118 MMHG | DIASTOLIC BLOOD PRESSURE: 66 MMHG | OXYGEN SATURATION: 99 % | HEART RATE: 71 BPM | WEIGHT: 196 LBS

## 2024-09-03 DIAGNOSIS — E89.0 POST-SURGICAL HYPOTHYROIDISM: Primary | ICD-10-CM

## 2024-09-03 DIAGNOSIS — Z85.850 HX OF PAPILLARY THYROID CARCINOMA: ICD-10-CM

## 2024-09-03 PROCEDURE — 99214 OFFICE O/P EST MOD 30 MIN: CPT | Performed by: PHYSICIAN ASSISTANT

## 2024-09-03 RX ORDER — LEVOTHYROXINE SODIUM 125 UG/1
TABLET ORAL
Qty: 102 TABLET | Refills: 1 | Status: SHIPPED | OUTPATIENT
Start: 2024-09-03

## 2024-09-03 ASSESSMENT — ENCOUNTER SYMPTOMS
DIARRHEA: 0
TROUBLE SWALLOWING: 0
CONSTIPATION: 1
VOICE CHANGE: 0

## 2024-09-03 NOTE — PROGRESS NOTES
[facility] ENDOCRINOLOGY   AND   THYROID NODULE CLINIC    Ginger Little PA-C  [facility] Endocrinology and Thyroid Nodule Clinic  [address] [place] [address], [address] [address]  Phone [phone]  Facsimile [phone]          Risa Simmons is a 53 y.o. female seen 9/3/2024 for follow up evaluation of post surgical hypothyroidism         Assessment and Plan:      1. Post-surgical hypothyroidism  Pt taking medication correctly . TSH goal is 0.4-1.5.   On TDD of 127 has TSH >1.5  Increase to 7.5 tabs of 125mcg dose,  and recheck labs in 8 weeks off biotin   - US HEAD NECK SOFT TISSUE THYROID; Future  - levothyroxine (SYNTHROID) 125 MCG tablet; 1 tab daily and 1 and a half tab on Sunday, TDD 134mcg  Dispense: 102 tablet; Refill: 1  - TSH; Future  - T4, Free; Future  - TSH; Future  - T4, Free; Future    2. Hx of papillary thyroid carcinoma  Check US of thyroid bed  - US HEAD NECK SOFT TISSUE THYROID; Future          Risa was seen today for follow-up.    Diagnoses and all orders for this visit:    Post-surgical hypothyroidism  -     US HEAD NECK SOFT TISSUE THYROID; Future  -     levothyroxine (SYNTHROID) 125 MCG tablet; 1 tab daily and 1 and a half tab on Sunday, TDD 134mcg  -     TSH; Future  -     T4, Free; Future  -     TSH; Future  -     T4, Free; Future    Hx of papillary thyroid carcinoma  -     US HEAD NECK SOFT TISSUE THYROID; Future              History of Present Illness:    09/03/24  Pt RTC doing well. No major changes in condition or symptoms. Is stopping biotin 5 days before labs. Taking medication correctly     5/1/2024     THYROID DYSFUNCTION  Risa Simmons is seen for follow up evaluation/management of post surgical hypothyroidism; this was diagnosed in 2000 after thyroidectomy for papillary thyroid cancer.      Review of Records:  pt referred by PCP for evaluation of post surgical hypothyroidism with HX of probably papillary thyroid carcinoma. Pt presents with records that show clear

## 2024-09-16 ENCOUNTER — PATIENT MESSAGE (OUTPATIENT)
Dept: INTERNAL MEDICINE CLINIC | Facility: CLINIC | Age: 54
End: 2024-09-16

## 2024-09-16 DIAGNOSIS — E89.0 POST-SURGICAL HYPOTHYROIDISM: ICD-10-CM

## 2024-09-16 DIAGNOSIS — E78.2 MIXED HYPERLIPIDEMIA: Primary | ICD-10-CM

## 2024-09-16 DIAGNOSIS — G35 MULTIPLE SCLEROSIS (HCC): ICD-10-CM

## 2024-10-05 SDOH — ECONOMIC STABILITY: FOOD INSECURITY: WITHIN THE PAST 12 MONTHS, THE FOOD YOU BOUGHT JUST DIDN'T LAST AND YOU DIDN'T HAVE MONEY TO GET MORE.: NEVER TRUE

## 2024-10-05 SDOH — ECONOMIC STABILITY: INCOME INSECURITY: HOW HARD IS IT FOR YOU TO PAY FOR THE VERY BASICS LIKE FOOD, HOUSING, MEDICAL CARE, AND HEATING?: NOT VERY HARD

## 2024-10-05 SDOH — ECONOMIC STABILITY: FOOD INSECURITY: WITHIN THE PAST 12 MONTHS, YOU WORRIED THAT YOUR FOOD WOULD RUN OUT BEFORE YOU GOT MONEY TO BUY MORE.: NEVER TRUE

## 2024-10-05 SDOH — ECONOMIC STABILITY: TRANSPORTATION INSECURITY
IN THE PAST 12 MONTHS, HAS LACK OF TRANSPORTATION KEPT YOU FROM MEETINGS, WORK, OR FROM GETTING THINGS NEEDED FOR DAILY LIVING?: NO

## 2024-10-08 ENCOUNTER — OFFICE VISIT (OUTPATIENT)
Dept: INTERNAL MEDICINE CLINIC | Facility: CLINIC | Age: 54
End: 2024-10-08
Payer: COMMERCIAL

## 2024-10-08 VITALS
DIASTOLIC BLOOD PRESSURE: 78 MMHG | TEMPERATURE: 97.2 F | OXYGEN SATURATION: 98 % | HEART RATE: 77 BPM | SYSTOLIC BLOOD PRESSURE: 114 MMHG | WEIGHT: 197 LBS | HEIGHT: 68 IN | BODY MASS INDEX: 29.86 KG/M2

## 2024-10-08 DIAGNOSIS — E78.2 MIXED HYPERLIPIDEMIA: ICD-10-CM

## 2024-10-08 DIAGNOSIS — G25.81 RESTLESS LEGS SYNDROME: ICD-10-CM

## 2024-10-08 DIAGNOSIS — N39.42 URINARY INCONTINENCE WITHOUT SENSORY AWARENESS: ICD-10-CM

## 2024-10-08 DIAGNOSIS — F42.9 OBSESSIVE-COMPULSIVE DISORDER, UNSPECIFIED TYPE: ICD-10-CM

## 2024-10-08 DIAGNOSIS — E89.0 POST-SURGICAL HYPOTHYROIDISM: ICD-10-CM

## 2024-10-08 DIAGNOSIS — G35 MULTIPLE SCLEROSIS (HCC): Primary | ICD-10-CM

## 2024-10-08 DIAGNOSIS — Z80.0 FAMILY HISTORY OF COLON CANCER: ICD-10-CM

## 2024-10-08 DIAGNOSIS — G43.809 OTHER MIGRAINE WITHOUT STATUS MIGRAINOSUS, NOT INTRACTABLE: ICD-10-CM

## 2024-10-08 PROCEDURE — 99214 OFFICE O/P EST MOD 30 MIN: CPT | Performed by: INTERNAL MEDICINE

## 2024-10-08 RX ORDER — SERTRALINE HYDROCHLORIDE 100 MG/1
200 TABLET, FILM COATED ORAL DAILY
Qty: 180 TABLET | Refills: 3 | Status: SHIPPED | OUTPATIENT
Start: 2024-10-08

## 2024-10-08 RX ORDER — POTASSIUM CHLORIDE 1500 MG/1
20 TABLET, EXTENDED RELEASE ORAL NIGHTLY
Qty: 90 TABLET | Refills: 3 | Status: SHIPPED | OUTPATIENT
Start: 2024-10-08

## 2024-10-08 RX ORDER — LEVOCETIRIZINE DIHYDROCHLORIDE 5 MG/1
5 TABLET, FILM COATED ORAL NIGHTLY
COMMUNITY

## 2024-10-08 RX ORDER — PRAMIPEXOLE DIHYDROCHLORIDE 0.25 MG/1
0.5 TABLET ORAL NIGHTLY
Qty: 180 TABLET | Refills: 3 | Status: SHIPPED | OUTPATIENT
Start: 2024-10-08

## 2024-10-08 ASSESSMENT — ENCOUNTER SYMPTOMS
SHORTNESS OF BREATH: 0
VOICE CHANGE: 0
BACK PAIN: 0
TROUBLE SWALLOWING: 0
ABDOMINAL PAIN: 0
NAUSEA: 0
CHEST TIGHTNESS: 0
VOMITING: 0

## 2024-10-08 NOTE — ASSESSMENT & PLAN NOTE
LDL elevated, with some improvement without any significant diet changes.  Not regularly following a low fat, low cholesterol diet.  She is active and busy, but not exercising regularly.     Intolerant of statins and zetia    A healthy diet to consider is a more mediterranean diet which is abundant in fruits, vegetables, whole grains, legumes and olive oil. It features fish and poultry, lean sources of protein over red meat.  The importance of a healthy lifestyle are discussed.  Limit high fructose containing foods, moderate portion sizes,  regular cardiovascular exercise (walking, swimming, aerobics) for 30-45 minutes, 3-4 times weekly.      The GINGER trial (Justification for the Use of Statins in Prevention: an Intervention Trial Evaluating Rosuvastatin) (N Engl J Med 2008; 359:2753-1990) is probably the best trial we that has shown benefit for primary prevention and end point data with the use of a statin.        Lab Results   Component Value Date    CHOL 282 (H) 09/13/2022    TRIG 243 (H) 09/13/2022    HDL 46 09/13/2022     Lab Results   Component Value Date    ALT 29 09/13/2022    AST 19 09/13/2022

## 2024-10-08 NOTE — ASSESSMENT & PLAN NOTE
Continue Synthroid 137 mcg daily.    Stable on 125 mcg for yrs and then developed fluctuation and increase of TSH mid 2023.  Medication adjusted.    Appears controlled at this time.      Remote complete thyroidectomy approx 2000 at [facility].  (Presumed papillary thyroid ca??? ) Due to her surgery being so long ago, the pathology is not immediately available on extensive chart review today.      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  TSH and T4 are controlled.   At the time of TSH changing,  this could be related to hormonal changes, medication changes and adjustments or lab error or potentially related to a breast biopsy and surgery 9/28/23???  In light of the additional history obtained today, endocrinology expertise to review my treatment, and make any additional moving forward.  She is agreeable to this plan.      Prior Pathology report and surgical notes reviewed in media file.     Endocrinology consultation monitoring.  Thyroid ultrasound completed.  Synthroid adjusted to 125 mcg, 1 tab daily and 1 and a half tab on Sunday, TDD 134mcg, Disp-102 tablet, R-1     US Result (most recent):  US HEAD NECK SOFT TISSUE THYROID 09/20/2024    Narrative  NECK/THYROID ULTRASOUND    INDICATION: Thyroid cancer status post total thyroidectomy.    TECHNIQUE: Ultrasound of the neck is performed.    COMPARISON: None available.    FINDINGS:    Status post total thyroidectomy. No sonographic evidence for residual or  recurrent thyroid tissue. No cervical lymphadenopathy. A mildly prominent  normal-appearing lymph node is seen in the left zone 3 region measuring 1.1 x  0.4 x 0.3

## 2024-10-08 NOTE — ASSESSMENT & PLAN NOTE
No further issues.  Seen by urogyn.      The most recent was while at work 3/30/24.      She was seen at urgent care and treated for uti with TMP-SMX for presumed infection.      Stop Mirapex- adverse SE?  (RLS)-  Resumed  Due to her MS, we discussed if she likely needs some updated imaging.  She will let me know if any assistance is needed.    Gyn check up current.  Neg pap and pelvic exam.    Discussed Urogyn may be needed if continues.  Monitor.

## 2024-10-08 NOTE — ASSESSMENT & PLAN NOTE
Neurology following.  On Avonex.  Routine follow up scheduled with Dr. Mcconnell with [facility], 4/24/24  On our review of her history today, she does report she has had 3 episodes of urine incontinence since our last visit. She reports fairly large volume of urine, not just leakage.   The most recent was while at work last weekend.  She was seen at urgent care and treated for uti with TMP-SMX for presumed infection.      Stop Mirapex- adverse SE?  (RLS)  We discussed if she likely needs some updated imaging.  She will let me know if any assistance is needed.

## 2024-10-08 NOTE — PROGRESS NOTES
2000 at [facility].  (Presumed papillary thyroid ca??? ) Due to her surgery being so long ago, the pathology is not immediately available on extensive chart review today.      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  TSH and T4 are controlled.   At the time of TSH changing,  this could be related to hormonal changes, medication changes and adjustments or lab error or potentially related to a breast biopsy and surgery 9/28/23???  In light of the additional history obtained today, endocrinology expertise to review my treatment, and make any additional moving forward.  She is agreeable to this plan.      Prior Pathology report and surgical notes reviewed in media file.     Endocrinology consultation monitoring.  Thyroid ultrasound completed.  Synthroid adjusted to 125 mcg, 1 tab daily and 1 and a half tab on Sunday, TDD 134mcg, Disp-102 tablet, R-1     US Result (most recent):  US HEAD NECK SOFT TISSUE THYROID 09/20/2024    Narrative  NECK/THYROID ULTRASOUND    INDICATION: Thyroid cancer status post total thyroidectomy.    TECHNIQUE: Ultrasound of the neck is performed.    COMPARISON: None available.    FINDINGS:    Status post total thyroidectomy. No sonographic evidence for residual or  recurrent thyroid tissue. No cervical lymphadenopathy. A mildly prominent  normal-appearing lymph node is seen in the left zone 3 region measuring 1.1 x  0.4 x 0.3 cm.    Impression  Status post total thyroidectomy without evidence for recurrence. No suspicious  cervical lymphadenopathy.        Electronically signed by Rodney Spears      Orders:  -     Lipid Panel; Future  -

## 2025-01-08 ENCOUNTER — OFFICE VISIT (OUTPATIENT)
Dept: ENDOCRINOLOGY | Age: 55
End: 2025-01-08
Payer: COMMERCIAL

## 2025-01-08 VITALS
DIASTOLIC BLOOD PRESSURE: 64 MMHG | OXYGEN SATURATION: 98 % | WEIGHT: 207 LBS | BODY MASS INDEX: 31.37 KG/M2 | RESPIRATION RATE: 18 BRPM | HEART RATE: 78 BPM | HEIGHT: 68 IN | SYSTOLIC BLOOD PRESSURE: 105 MMHG

## 2025-01-08 DIAGNOSIS — E89.0 POST-SURGICAL HYPOTHYROIDISM: Primary | ICD-10-CM

## 2025-01-08 DIAGNOSIS — Z85.850 HX OF PAPILLARY THYROID CARCINOMA: ICD-10-CM

## 2025-01-08 PROCEDURE — 99214 OFFICE O/P EST MOD 30 MIN: CPT | Performed by: PHYSICIAN ASSISTANT

## 2025-01-08 RX ORDER — LEVOTHYROXINE SODIUM 137 UG/1
137 TABLET ORAL DAILY
Qty: 90 TABLET | Refills: 3 | Status: SHIPPED | OUTPATIENT
Start: 2025-01-08

## 2025-01-08 RX ORDER — METHYLPHENIDATE HYDROCHLORIDE 20 MG/1
20 TABLET ORAL
COMMUNITY
Start: 2024-10-24

## 2025-01-08 ASSESSMENT — ENCOUNTER SYMPTOMS
TROUBLE SWALLOWING: 0
CONSTIPATION: 1
DIARRHEA: 0
VOICE CHANGE: 0

## 2025-01-08 NOTE — PROGRESS NOTES
Exam  Constitutional:       Appearance: Normal appearance.   HENT:      Head: Normocephalic.   Neck:      Thyroid: No thyroid mass.      Vascular: No carotid bruit.      Comments: Well healed surgical scar inferior neck    Cardiovascular:      Rate and Rhythm: Normal rate and regular rhythm.   Pulmonary:      Effort: Pulmonary effort is normal.      Breath sounds: Normal breath sounds.   Abdominal:      Palpations: Abdomen is soft.   Musculoskeletal:      Cervical back: Neck supple.      Right lower leg: No edema.      Left lower leg: No edema.   Lymphadenopathy:      Cervical: No cervical adenopathy.   Skin:     General: Skin is warm and dry.   Neurological:      General: No focal deficit present.      Mental Status: She is alert.   Psychiatric:         Mood and Affect: Mood normal.         Behavior: Behavior normal.         Thought Content: Thought content normal.         Judgment: Judgment normal.            Return in about 16 weeks (around 4/30/2025) for hypothyroidism f/u.      Portions of this note were generated with the assistance of voice recogniton software.  As such, some errors in transcription may be present.

## 2025-01-08 NOTE — PATIENT INSTRUCTIONS
Thyroid replacement dosing instructions:    Take first thing in the morning when your stomach is empty  Take with only water (no other liquids like coffee, soda, juice, tea)  Take by itself, nothing else to eat or drink  Wait 30 minutes to 1 hour before eating or drinking anything other than water  Take vitamins/minerals and stomach acid medication at least 4 hours later (lunch or later)  Avoid biotin 3-5 days before labs drawn    Plan to have your labs rechecked in about 8 weeks at the same lab

## 2025-01-20 DIAGNOSIS — E89.0 POST-SURGICAL HYPOTHYROIDISM: ICD-10-CM

## 2025-01-21 RX ORDER — LEVOTHYROXINE SODIUM 137 UG/1
137 TABLET ORAL DAILY
Qty: 90 TABLET | Refills: 3 | Status: SHIPPED | OUTPATIENT
Start: 2025-01-21

## 2025-02-04 DIAGNOSIS — E89.0 POST-SURGICAL HYPOTHYROIDISM: ICD-10-CM

## 2025-02-04 RX ORDER — LEVOTHYROXINE SODIUM 137 UG/1
137 TABLET ORAL DAILY
Qty: 90 TABLET | Refills: 3 | Status: SHIPPED | OUTPATIENT
Start: 2025-02-04

## 2025-02-04 NOTE — TELEPHONE ENCOUNTER
[facility] not get at [facility]. Wants to try the [facility] I have attached to the refill.   Instructions: This plan will send the code FBSE to the PM system.  DO NOT or CHANGE the price. Price (Do Not Change): 0.00 Detail Level: Simple

## 2025-03-04 ENCOUNTER — PATIENT MESSAGE (OUTPATIENT)
Dept: ENDOCRINOLOGY | Age: 55
End: 2025-03-04

## 2025-03-04 DIAGNOSIS — E89.0 POST-SURGICAL HYPOTHYROIDISM: Primary | ICD-10-CM

## 2025-03-04 DIAGNOSIS — Z85.850 HX OF PAPILLARY THYROID CARCINOMA: ICD-10-CM

## 2025-03-05 RX ORDER — LEVOTHYROXINE SODIUM 137 UG/1
137 TABLET ORAL DAILY
Qty: 90 TABLET | Refills: 1 | Status: SHIPPED | OUTPATIENT
Start: 2025-03-05

## 2025-03-05 NOTE — TELEPHONE ENCOUNTER
Anthony response:    Oh no! Thanks for letting me know.     I can order Synthroid or levoxyl. It appears that levoxyl is covered so I am sending that in to [facility] [place] to replace the name brand unithroid     Let me know if any other issues    ~Ginger

## 2025-04-08 ENCOUNTER — OFFICE VISIT (OUTPATIENT)
Dept: INTERNAL MEDICINE CLINIC | Facility: CLINIC | Age: 55
End: 2025-04-08
Payer: COMMERCIAL

## 2025-04-08 VITALS
SYSTOLIC BLOOD PRESSURE: 116 MMHG | HEIGHT: 68 IN | DIASTOLIC BLOOD PRESSURE: 84 MMHG | TEMPERATURE: 97.5 F | WEIGHT: 211 LBS | HEART RATE: 66 BPM | OXYGEN SATURATION: 100 % | BODY MASS INDEX: 31.98 KG/M2

## 2025-04-08 DIAGNOSIS — Z12.11 COLON CANCER SCREENING: ICD-10-CM

## 2025-04-08 DIAGNOSIS — G25.81 RESTLESS LEGS SYNDROME: ICD-10-CM

## 2025-04-08 DIAGNOSIS — E78.2 MIXED HYPERLIPIDEMIA: Primary | ICD-10-CM

## 2025-04-08 DIAGNOSIS — F42.9 OBSESSIVE-COMPULSIVE DISORDER, UNSPECIFIED TYPE: ICD-10-CM

## 2025-04-08 DIAGNOSIS — Z80.0 FAMILY HISTORY OF COLON CANCER: ICD-10-CM

## 2025-04-08 DIAGNOSIS — G35 MULTIPLE SCLEROSIS (HCC): ICD-10-CM

## 2025-04-08 DIAGNOSIS — Z12.31 ENCOUNTER FOR SCREENING MAMMOGRAM FOR MALIGNANT NEOPLASM OF BREAST: ICD-10-CM

## 2025-04-08 DIAGNOSIS — E89.0 POST-SURGICAL HYPOTHYROIDISM: ICD-10-CM

## 2025-04-08 PROCEDURE — 99214 OFFICE O/P EST MOD 30 MIN: CPT | Performed by: INTERNAL MEDICINE

## 2025-04-08 RX ORDER — PRAMIPEXOLE DIHYDROCHLORIDE 0.25 MG/1
0.5 TABLET ORAL NIGHTLY
Qty: 180 TABLET | Refills: 3 | Status: SHIPPED | OUTPATIENT
Start: 2025-04-08

## 2025-04-08 RX ORDER — LEVOTHYROXINE SODIUM 137 MCG
137 TABLET ORAL DAILY
Qty: 90 TABLET | Refills: 3 | Status: SHIPPED | OUTPATIENT
Start: 2025-04-08

## 2025-04-08 RX ORDER — SERTRALINE HYDROCHLORIDE 100 MG/1
200 TABLET, FILM COATED ORAL DAILY
Qty: 180 TABLET | Refills: 3 | Status: SHIPPED | OUTPATIENT
Start: 2025-04-08

## 2025-04-08 SDOH — ECONOMIC STABILITY: FOOD INSECURITY: WITHIN THE PAST 12 MONTHS, THE FOOD YOU BOUGHT JUST DIDN'T LAST AND YOU DIDN'T HAVE MONEY TO GET MORE.: NEVER TRUE

## 2025-04-08 SDOH — ECONOMIC STABILITY: FOOD INSECURITY: WITHIN THE PAST 12 MONTHS, YOU WORRIED THAT YOUR FOOD WOULD RUN OUT BEFORE YOU GOT MONEY TO BUY MORE.: NEVER TRUE

## 2025-04-08 ASSESSMENT — PATIENT HEALTH QUESTIONNAIRE - PHQ9
3. TROUBLE FALLING OR STAYING ASLEEP: SEVERAL DAYS
SUM OF ALL RESPONSES TO PHQ QUESTIONS 1-9: 6
10. IF YOU CHECKED OFF ANY PROBLEMS, HOW DIFFICULT HAVE THESE PROBLEMS MADE IT FOR YOU TO DO YOUR WORK, TAKE CARE OF THINGS AT HOME, OR GET ALONG WITH OTHER PEOPLE: SOMEWHAT DIFFICULT
1. LITTLE INTEREST OR PLEASURE IN DOING THINGS: SEVERAL DAYS
9. THOUGHTS THAT YOU WOULD BE BETTER OFF DEAD, OR OF HURTING YOURSELF: NOT AT ALL
SUM OF ALL RESPONSES TO PHQ QUESTIONS 1-9: 6
2. FEELING DOWN, DEPRESSED OR HOPELESS: SEVERAL DAYS
8. MOVING OR SPEAKING SO SLOWLY THAT OTHER PEOPLE COULD HAVE NOTICED. OR THE OPPOSITE, BEING SO FIGETY OR RESTLESS THAT YOU HAVE BEEN MOVING AROUND A LOT MORE THAN USUAL: NOT AT ALL
4. FEELING TIRED OR HAVING LITTLE ENERGY: SEVERAL DAYS
6. FEELING BAD ABOUT YOURSELF - OR THAT YOU ARE A FAILURE OR HAVE LET YOURSELF OR YOUR FAMILY DOWN: SEVERAL DAYS
5. POOR APPETITE OR OVEREATING: SEVERAL DAYS
7. TROUBLE CONCENTRATING ON THINGS, SUCH AS READING THE NEWSPAPER OR WATCHING TELEVISION: NOT AT ALL
SUM OF ALL RESPONSES TO PHQ QUESTIONS 1-9: 6
SUM OF ALL RESPONSES TO PHQ QUESTIONS 1-9: 6

## 2025-04-08 ASSESSMENT — ENCOUNTER SYMPTOMS
VOMITING: 0
ABDOMINAL PAIN: 0
SHORTNESS OF BREATH: 0
BACK PAIN: 0
VOICE CHANGE: 0
NAUSEA: 0
TROUBLE SWALLOWING: 0
CHEST TIGHTNESS: 0

## 2025-04-08 NOTE — ASSESSMENT & PLAN NOTE
Neurology following.  On Avonex.  Routine follow up scheduled with Dr. Mcconnell with [facility], 4/24/24  On our review of her history today, she does report she has had 3 episodes of urine incontinence since our last visit. She reports fairly large volume of urine, not just leakage.   The most recent was while at work last weekend.  She was seen at urgent care and treated for uti with TMP-SMX for presumed infection.      Stop Mirapex- adverse SE?  (RLS)  We discussed if she likely needs some updated imaging.  She will let me know if any assistance is needed.      4/8/25       
LDL elevated, with some improvement without any significant diet changes.  Not regularly following a low fat, low cholesterol diet.  She is active and busy, but not exercising regularly.     Intolerant of statins and zetia    A healthy diet to consider is a more mediterranean diet which is abundant in fruits, vegetables, whole grains, legumes and olive oil. It features fish and poultry, lean sources of protein over red meat.  The importance of a healthy lifestyle are discussed.  Limit high fructose containing foods, moderate portion sizes,  regular cardiovascular exercise (walking, swimming, aerobics) for 30-45 minutes, 3-4 times weekly.      The GINGER trial (Justification for the Use of Statins in Prevention: an Intervention Trial Evaluating Rosuvastatin) (N Engl J Med 2008; 359:7910-7313) is probably the best trial we that has shown benefit for primary prevention and end point data with the use of a statin.        Lab Results   Component Value Date    CHOL 282 (H) 09/13/2022    TRIG 243 (H) 09/13/2022    HDL 46 09/13/2022     Lab Results   Component Value Date    ALT 29 09/13/2022    AST 19 09/13/2022          
cm.    Impression  Status post total thyroidectomy without evidence for recurrence. No suspicious  cervical lymphadenopathy.      4/8/25   TSH increased?

## 2025-04-08 NOTE — PROGRESS NOTES
ASSESSMENT/PLAN:    1. Mixed hyperlipidemia  Assessment & Plan:  LDL elevated, with some improvement without any significant diet changes.  Not regularly following a low fat, low cholesterol diet.  She is active and busy, but not exercising regularly.     Intolerant of statins and zetia    A healthy diet to consider is a more mediterranean diet which is abundant in fruits, vegetables, whole grains, legumes and olive oil. It features fish and poultry, lean sources of protein over red meat.  The importance of a healthy lifestyle are discussed.  Limit high fructose containing foods, moderate portion sizes,  regular cardiovascular exercise (walking, swimming, aerobics) for 30-45 minutes, 3-4 times weekly.      The GINGER trial (Justification for the Use of Statins in Prevention: an Intervention Trial Evaluating Rosuvastatin) (N Engl J Med 2008; 359:5395-9009) is probably the best trial we that has shown benefit for primary prevention and end point data with the use of a statin.        Lab Results   Component Value Date    CHOL 282 (H) 09/13/2022    TRIG 243 (H) 09/13/2022    HDL 46 09/13/2022     Lab Results   Component Value Date    ALT 29 09/13/2022    AST 19 09/13/2022            Orders:  -     CBC; Future  -     Comprehensive Metabolic Panel; Future  -     TSH reflex to FT4; Future  -     Lipid Panel; Future  2. Obsessive-compulsive disorder, unspecified type  -     sertraline (ZOLOFT) 100 MG tablet; Take 2 tablets by mouth daily, Disp-180 tablet, R-3Normal  -     CBC; Future  -     Comprehensive Metabolic Panel; Future  -     TSH reflex to FT4; Future  -     Lipid Panel; Future  3. Restless legs syndrome  -     pramipexole (MIRAPEX) 0.25 MG tablet; Take 2 tablets by mouth at bedtime, Disp-180 tablet, R-3Normal  4. Post-surgical hypothyroidism  Assessment & Plan:     Continue Synthroid 137 mcg daily.    Stable on 125 mcg for yrs and then developed fluctuation and increase of TSH mid 2023.  Medication adjusted.

## 2025-07-14 ENCOUNTER — OFFICE VISIT (OUTPATIENT)
Dept: ENDOCRINOLOGY | Age: 55
End: 2025-07-14
Payer: COMMERCIAL

## 2025-07-14 VITALS
OXYGEN SATURATION: 98 % | HEART RATE: 73 BPM | WEIGHT: 204 LBS | HEIGHT: 68 IN | DIASTOLIC BLOOD PRESSURE: 76 MMHG | RESPIRATION RATE: 18 BRPM | SYSTOLIC BLOOD PRESSURE: 114 MMHG | BODY MASS INDEX: 30.92 KG/M2

## 2025-07-14 DIAGNOSIS — E89.0 POST-SURGICAL HYPOTHYROIDISM: Primary | ICD-10-CM

## 2025-07-14 DIAGNOSIS — Z85.850 HX OF PAPILLARY THYROID CARCINOMA: ICD-10-CM

## 2025-07-14 PROCEDURE — 99214 OFFICE O/P EST MOD 30 MIN: CPT | Performed by: PHYSICIAN ASSISTANT

## 2025-07-14 RX ORDER — BIOTIN 10 MG
10 TABLET ORAL DAILY
COMMUNITY

## 2025-07-14 RX ORDER — LEVOTHYROXINE SODIUM 137 MCG
137 TABLET ORAL DAILY
Qty: 90 TABLET | Refills: 3 | Status: SHIPPED | OUTPATIENT
Start: 2025-07-14

## 2025-07-14 ASSESSMENT — ENCOUNTER SYMPTOMS
CONSTIPATION: 1
DIARRHEA: 0
VOICE CHANGE: 0
TROUBLE SWALLOWING: 0

## 2025-07-14 NOTE — PROGRESS NOTES
[facility] ENDOCRINOLOGY   AND   THYROID NODULE CLINIC    Ginger Little PA-C  [facility] Endocrinology and Thyroid Nodule Clinic  [address] [place] [address], [address], [place] [address]  Phone [phone]  Facsimile [phone]          Risa Simmons is a 54 y.o. female seen 7/14/2025 for follow up evaluation of post surgical hypothyroidism         Assessment and Plan:      1. Post-surgical hypothyroidism  Patient is biochemically euthyroid on 137 mcg of name brand Synthroid which is being taken correctly    - SYNTHROID 137 MCG tablet; Take 1 tablet by mouth Daily  Dispense: 90 tablet; Refill: 3  - TSH; Future  - T4, Free; Future  - Thyroglobulin Ab and Thyroglobulin, BATSHEVA or HEENA; Future    2. Hx of papillary thyroid carcinoma  TSH goal is 0.4-1.5.   Last  Sept 2024- IMPRESSION:  Status post total thyroidectomy without evidence for recurrence. No suspicious  cervical lymphadenopathy.  - Thyroglobulin Ab and Thyroglobulin, BATSHEVA or HEENA; Future        Risa was seen today for follow-up and thyroid problem.    Diagnoses and all orders for this visit:    Post-surgical hypothyroidism  -     SYNTHROID 137 MCG tablet; Take 1 tablet by mouth Daily  -     TSH; Future  -     T4, Free; Future  -     Thyroglobulin Ab and Thyroglobulin, BATSHEVA or HEENA; Future    Hx of papillary thyroid carcinoma  -     Thyroglobulin Ab and Thyroglobulin, BATSHEVA or HEENA; Future                  History of Present Illness:    07/14/2025  History of Present Illness  The patient, a 54-year-old female, returns for follow-up regarding hypothyroidism, with a notable history of thyroid carcinoma.    The patient is currently managing her condition with Synthroid 137 mcg, administered orally in the morning with water, followed by a fasting period of four hours before consuming any other substances. She reports symptomatic improvement and favorable laboratory results. She discontinued biotin supplementation one week ago. The patient notes a reduction in mood